# Patient Record
Sex: MALE | Race: WHITE | NOT HISPANIC OR LATINO | Employment: FULL TIME | URBAN - METROPOLITAN AREA
[De-identification: names, ages, dates, MRNs, and addresses within clinical notes are randomized per-mention and may not be internally consistent; named-entity substitution may affect disease eponyms.]

---

## 2019-08-21 ENCOUNTER — HOSPITAL ENCOUNTER (OUTPATIENT)
Dept: GENERAL RADIOLOGY | Facility: HOSPITAL | Age: 39
Discharge: HOME | End: 2019-08-21
Attending: INTERNAL MEDICINE

## 2019-10-07 ENCOUNTER — HOSPITAL ENCOUNTER (OUTPATIENT)
Dept: OTHER | Facility: HOSPITAL | Age: 39
Discharge: HOME | End: 2019-10-07
Attending: EMERGENCY MEDICINE

## 2020-01-22 ENCOUNTER — HOSPITAL ENCOUNTER (OUTPATIENT)
Dept: SLEEP MEDICINE | Facility: HOSPITAL | Age: 40
Discharge: HOME | End: 2020-01-22
Attending: PSYCHIATRY & NEUROLOGY

## 2020-06-08 ENCOUNTER — HOSPITAL ENCOUNTER (OUTPATIENT)
Dept: OTHER | Facility: HOSPITAL | Age: 40
Discharge: HOME | End: 2020-06-08
Attending: EMERGENCY MEDICINE

## 2021-05-14 ENCOUNTER — HOSPITAL ENCOUNTER (OUTPATIENT)
Dept: OTHER | Facility: HOSPITAL | Age: 41
Discharge: HOME | End: 2021-05-14
Attending: EMERGENCY MEDICINE

## 2022-02-22 ENCOUNTER — TRANSCRIBE ORDERS (OUTPATIENT)
Dept: ADMINISTRATIVE | Facility: HOSPITAL | Age: 42
End: 2022-02-22

## 2022-02-22 ENCOUNTER — OFFICE VISIT (OUTPATIENT)
Dept: SLEEP MEDICINE | Facility: HOSPITAL | Age: 42
End: 2022-02-22

## 2022-02-22 VITALS
TEMPERATURE: 97.6 F | OXYGEN SATURATION: 96 % | SYSTOLIC BLOOD PRESSURE: 143 MMHG | HEART RATE: 68 BPM | WEIGHT: 270 LBS | DIASTOLIC BLOOD PRESSURE: 88 MMHG

## 2022-02-22 DIAGNOSIS — I49.9 CARDIAC ARRHYTHMIA, UNSPECIFIED CARDIAC ARRHYTHMIA TYPE: Primary | ICD-10-CM

## 2022-02-22 DIAGNOSIS — G47.33 OSA (OBSTRUCTIVE SLEEP APNEA): Primary | ICD-10-CM

## 2022-02-22 PROCEDURE — G0463 HOSPITAL OUTPT CLINIC VISIT: HCPCS | Performed by: PSYCHIATRY & NEUROLOGY

## 2022-03-02 ENCOUNTER — HOSPITAL ENCOUNTER (OUTPATIENT)
Dept: CARDIOLOGY | Facility: HOSPITAL | Age: 42
Discharge: HOME | End: 2022-03-02
Admitting: NURSE PRACTITIONER

## 2022-03-02 DIAGNOSIS — I49.9 CARDIAC ARRHYTHMIA, UNSPECIFIED CARDIAC ARRHYTHMIA TYPE: ICD-10-CM

## 2022-03-02 PROCEDURE — 93246 EXT ECG>7D<15D RECORDING: CPT

## 2022-03-08 NOTE — PROGRESS NOTES
[facility]    SLEEP CLINIC FOLLOW UP PROGRESS NOTE.    Nirmal Mendoza  1980  41 y.o.  male      PCP: Bharath Parkinson MD      Date of visit: 2/22/2022  The patient is returning for follow-up visit  Reason for follow-up visit: Obstructive sleep apnea     HPI:  This is a 41 y.o. years old patient who has a history of obstructive sleep apnea.  He is here for  compliance follow-up.  Sleep apnea was initially severe, showing an AHI of 70.3 events per hour total sleep time when he had his a sleep study done in 2011.  Following a significant amount of weight loss, his sleep apnea improved to an AHI of 5.0 events per hour total sleep time.  However, unfortunately, he gained his weight back.  He has continued to use his CPAP machine.  He was last seen for a follow-up visit on 1/22/2020 showing 93 9.4% compliance. Patient is using positive airway pressure therapy with CPAP and the symptoms of snoring, non-restorative sleep and daytime excessive sleepiness have improved significantly.  Normally goes to bed at 10 PM and wakes up at 5:30 AM.  The patient wakes up only 1 time to use the restroom during the night and has no problem going back to sleep.  Feels refreshed after waking up.  Patient also denies headaches and nasal congestion.  Since his last follow-up visit, he reports that his weight has been stable.  He denies any problems with current device settings.  He has been using his CPAP machine regularly.  He denies any recent injuries to the nose or surgeries to the nose and throat area.    Interval past medical/surgical history: Noncontributory    Mediactions and allergies are reviewed by me and documented in the encounter.     SOCIAL ( habits pertaining to sleep medicine)  History of smoking:Yes.  He chews tobacco  History of alcohol use: 6 per week  Caffeine use: 0    REVIEW OF SYSTEMS:   Dewey Sleepiness Scale :Total score: 2   Nsaal congestion: No  Dry mouth/nose: No  Post nasal drip; no  Acid  reflux/Heartburn: No  Abd bloating: No  Morining headache: No  Anxiety: No  Depression: No    Physical Exam :  CONSTITUTIONAL:  Vitals:    02/22/22 1100   BP: 143/88   Pulse: 68   Temp: 97.6 °F (36.4 °C)   SpO2: 96%   Weight: 122 kg (270 lb)    There is no height or weight on file to calculate BMI.   Neck: No carotid bruits  RESP SYSTEM: Breath sounds are normal, no wheezes or crackles  CARDIOVASULAR: Heart rate is regular without murmur.  Possible PVCs noted.  Neuropsych: Is awake alert and oriented.  Responses are coherent and relevant.  Mood and affect appeared appropriate.      Data reviewed:  The Smart card downloaded on 2/22/2022 has been reviewed independently by me for compliance and discussed the data with the patient.   Compliance; 100%  More than 4 hr use, 100%  Average use of the device 8 hours 16 minutes per night  Residual AHI: 2.6 /hr (goal < 5.0 /hr).  He is currently on auto CPAP at 6 to 13 cm water pressure.  His current therapy pressures range from 10.5-13 cmH2O.  Mask type: Nasal  DME: [facility] [facility]     ASSESSMENT AND PLAN:  · Obstructive sleep apnea ( G 47.33).  The symptoms of sleep apnea have improved with the device and the treatment.  Patient's compliance with the device is excellent for treatment of sleep apnea.  I have independently reviewed the smart card down load and discussed with the patient the download data and encouraged  the patient to continue to use the device.The residual AHI is acceptable. The patient is also instructed to get the supplies from the [employer] and and change them on a regular basis.  A prescription for supplies has been sent to the [employer].  I have also discussed the good sleep hygiene habits and adequate amount of sleep needed for good health.  · Cardiac arrhythmia-probable PVCs.  He was asked to check with his PCP regarding this.  · Equipment recall discussed.  He was given the information to check on this.  · Return in about 1 year (around 2/22/2023). .  Patient's questions were answered.      Zo Berry MD  3/8/2022

## 2022-03-27 LAB
MAXIMAL PREDICTED HEART RATE: 179 BPM
STRESS TARGET HR: 152 BPM

## 2022-03-27 PROCEDURE — 93248 EXT ECG>7D<15D REV&INTERPJ: CPT | Performed by: INTERNAL MEDICINE

## 2022-03-28 ENCOUNTER — TELEPHONE (OUTPATIENT)
Dept: CARDIOLOGY | Facility: HOSPITAL | Age: 42
End: 2022-03-28

## 2022-03-28 NOTE — TELEPHONE ENCOUNTER
Spoke with Olamide at [facility]'s office and told her that Dr. Calderon asked me to call and give the results of ab normal Holter report so that the provider could go over with the patient.  8:22am

## 2022-07-03 ENCOUNTER — APPOINTMENT (OUTPATIENT)
Dept: GENERAL RADIOLOGY | Facility: HOSPITAL | Age: 42
End: 2022-07-03

## 2022-07-03 VITALS
OXYGEN SATURATION: 97 % | DIASTOLIC BLOOD PRESSURE: 60 MMHG | HEIGHT: 70 IN | SYSTOLIC BLOOD PRESSURE: 131 MMHG | HEART RATE: 78 BPM | WEIGHT: 279.1 LBS | BODY MASS INDEX: 39.96 KG/M2 | RESPIRATION RATE: 20 BRPM | TEMPERATURE: 98.3 F

## 2022-07-03 PROCEDURE — 99282 EMERGENCY DEPT VISIT SF MDM: CPT

## 2022-07-03 PROCEDURE — 73130 X-RAY EXAM OF HAND: CPT

## 2022-07-03 RX ORDER — LIDOCAINE HYDROCHLORIDE AND EPINEPHRINE 10; 10 MG/ML; UG/ML
10 INJECTION, SOLUTION INFILTRATION; PERINEURAL ONCE
Status: COMPLETED | OUTPATIENT
Start: 2022-07-04 | End: 2022-07-04

## 2022-07-04 ENCOUNTER — HOSPITAL ENCOUNTER (EMERGENCY)
Facility: HOSPITAL | Age: 42
Discharge: HOME | End: 2022-07-04
Attending: EMERGENCY MEDICINE | Admitting: EMERGENCY MEDICINE

## 2022-07-04 DIAGNOSIS — S61.421A LACERATION OF RIGHT HAND WITH FOREIGN BODY, INITIAL ENCOUNTER: Primary | ICD-10-CM

## 2022-07-04 RX ORDER — OLMESARTAN MEDOXOMIL AND HYDROCHLOROTHIAZIDE 40/12.5 40; 12.5 MG/1; MG/1
TABLET ORAL
COMMUNITY

## 2022-07-04 RX ORDER — VILAZODONE HYDROCHLORIDE 20 MG/1
TABLET ORAL
COMMUNITY

## 2022-07-04 RX ADMIN — LIDOCAINE HYDROCHLORIDE AND EPINEPHRINE 10 ML: 10; 10 INJECTION, SOLUTION INFILTRATION; PERINEURAL at 03:25

## 2022-07-04 NOTE — DISCHARGE INSTRUCTIONS
Rest.  Ice.  Elevate.    Keep wound clean and dry.  May wash daily with soap and water.    Sutures out in 7 to 10 days.  Follow-up with your PCP.    Alternate Tylenol and ibuprofen for any fever or pain    Return for new or worsening symptoms
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
## 2023-12-29 ENCOUNTER — HOSPITAL ENCOUNTER (OUTPATIENT)
Dept: GENERAL RADIOLOGY | Facility: HOSPITAL | Age: 43
Discharge: HOME | End: 2023-12-29
Admitting: NURSE PRACTITIONER
Payer: COMMERCIAL

## 2023-12-29 ENCOUNTER — TRANSCRIBE ORDERS (OUTPATIENT)
Dept: ADMINISTRATIVE | Facility: HOSPITAL | Age: 43
End: 2023-12-29
Payer: COMMERCIAL

## 2023-12-29 DIAGNOSIS — M79.672 LEFT FOOT PAIN: ICD-10-CM

## 2023-12-29 DIAGNOSIS — M10.9 GOUT OF LEFT FOOT, UNSPECIFIED CAUSE, UNSPECIFIED CHRONICITY: ICD-10-CM

## 2023-12-29 DIAGNOSIS — M10.9 GOUT OF LEFT FOOT, UNSPECIFIED CAUSE, UNSPECIFIED CHRONICITY: Primary | ICD-10-CM

## 2023-12-29 PROCEDURE — 73630 X-RAY EXAM OF FOOT: CPT

## 2024-01-15 ENCOUNTER — OFFICE VISIT (OUTPATIENT)
Dept: SLEEP MEDICINE | Facility: HOSPITAL | Age: 44
End: 2024-01-15
Payer: COMMERCIAL

## 2024-01-15 VITALS
WEIGHT: 233.6 LBS | HEIGHT: 70 IN | HEART RATE: 33 BPM | SYSTOLIC BLOOD PRESSURE: 152 MMHG | OXYGEN SATURATION: 96 % | DIASTOLIC BLOOD PRESSURE: 70 MMHG | BODY MASS INDEX: 33.44 KG/M2

## 2024-01-15 DIAGNOSIS — E66.9 CLASS 1 OBESITY: Primary | ICD-10-CM

## 2024-01-15 DIAGNOSIS — I10 ESSENTIAL HYPERTENSION, BENIGN: ICD-10-CM

## 2024-01-15 PROBLEM — E66.811 CLASS 1 OBESITY: Status: ACTIVE | Noted: 2024-01-15

## 2024-01-15 PROBLEM — G47.33 OSA ON CPAP: Status: ACTIVE | Noted: 2024-01-15

## 2024-01-15 PROCEDURE — G0463 HOSPITAL OUTPT CLINIC VISIT: HCPCS

## 2024-01-15 PROCEDURE — 99203 OFFICE O/P NEW LOW 30 MIN: CPT | Performed by: INTERNAL MEDICINE

## 2024-01-15 RX ORDER — ATENOLOL 25 MG/1
25 TABLET ORAL DAILY
COMMUNITY

## 2024-01-15 RX ORDER — VALSARTAN AND HYDROCHLOROTHIAZIDE 80; 12.5 MG/1; MG/1
1 TABLET, FILM COATED ORAL DAILY
COMMUNITY
Start: 2023-12-11

## 2024-01-15 RX ORDER — VILAZODONE HYDROCHLORIDE 40 MG/1
TABLET ORAL
COMMUNITY
Start: 2023-10-27

## 2024-01-15 NOTE — PROGRESS NOTES
"  [address] [address]  Phone: [phone]  Fax: [phone]      Nirmal Mendoza  [id number]   1980  43 y.o.  male      PCP:Bharath Parkinson MD    Type of service: Initial New Patient Office Visit  Date of service: 1/15/2024    Chief Complaint   Patient presents with    Sleep Apnea    Obesity       History of present illness;  Nirmal Mendoza 43 y.o.  is a new patient for me and was seen today for management of obstructive sleep apnea.  Used to see Dr. Berry in the past.  He had a split-night study in September 2011 found to have severe sleep apnea with AHI of 70/h and has been given a auto CPAP.  He uses the CPAP on a regular basis but unfortunately he had not seen physician for the past 2 years.  He needs supplies.  He is a  and also drives trucks.    Patient gives the following sleep history.  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  Full review of systems available on the intake form which is scanned in the media tab.  The relevant positive are noted below  Daytime excessive sleepiness with Grinnell Sleepiness Scale :Total score: 1   Snoring resolved        Physical exam:  Vitals:    01/15/24 1100   BP: 152/70   Pulse: (!) 33   SpO2: 96%   Weight: 106 kg (233 lb 9.6 oz)   Height: 177.8 cm (70\")    Body mass index is 33.52 kg/m². Neck Circumference: 17 inches  Nose: no nasal " septal defects or deviation and the nasal passages are clear, no nasal polyps,  Throat: tonsils are nonenlarged, tongue normal, oral airway Mallampati class 3  NECK:Neck Circumference: 17 inches, trachea is in the midline, thyroid not enlarged  RESPIRATORY SYSTEM: Breath sounds are equal on both sides, there are no wheezes   CARDIOVASULAR SYSTEM: Heart sounds are regular rhythm and sundeep rate, no edema  EXTREMITES: No cyanosis, clubbing  NEUROLOGICAL SYSTEM: Oriented x 3, no gross motor defects, gait normal        The Smart card downloaded on 1/15/2024 has been independently reviewed by me and discussed the data with the patient. It shows the following..  Compliance; 100%  > 4 hr use, 98%  Average use of the device 7 hours and 43 minutes per night  Residual AHI: 3 /hr (normal less than 5)  Mask type: Nasal mask  Device: [employer]ics DreamStation  DME: [facility] [facility]    Assessment and plan:  Obstructive sleep apnea ( G 47.33).  The symptoms of sleep apnea have improved with the device and the treatment.  Patient's compliance with the device is excellent for treatment of sleep apnea.  I have independently reviewed the smart card down load and discussed with the patient the download data and encouarged the patient to continue to use the device.The residual AHI is acceptable. The device is benefiting the patient and the device is medically necessary. Without proper control of sleep apnea and good compliance there is a increased risk for hypertension, diabetes mellitus and nonrestorative sleep with hypersomnia which can increase risk for motor vehicle accidents.  Untreated sleep apnea is also a risk factor for development of atrial fibrillation, pulmonary hypertension and stroke.The patient is also instructed to get the supplies from the [employer] and and change them on a regular basis.  A prescription for supplies has been sent to the [employer].  I have also discussed the good sleep hygiene habits and adequate amount of  sleep needed for good health.   Obesity 1, with BMI Body mass index is 33.52 kg/m².. I have discussed the relationship between weight and sleep apnea.There is direct correlation between weight and severity of sleep apnea.  Weight reduction is encouraged, as it is going to reduce the severity of sleep apnea. I have also discussed with the patient diet and exercise to achieve ideal body weight  Hypertension,  Essential hypertension is highly correlated with sleep apnea. Treating sleep apnea will assist in good blood pressure control.  Return in about 1 year (around 1/15/2025) for with smart card down load..  Patient's questions were answered.      1/15/2024  Chele Lockhart MD  Sleep Medicine  Medical Director  [facility]: [facility] sleep [facility]

## 2025-01-22 ENCOUNTER — OFFICE VISIT (OUTPATIENT)
Dept: SLEEP MEDICINE | Facility: HOSPITAL | Age: 45
End: 2025-01-22
Payer: COMMERCIAL

## 2025-01-22 VITALS
HEART RATE: 36 BPM | BODY MASS INDEX: 28.39 KG/M2 | OXYGEN SATURATION: 99 % | DIASTOLIC BLOOD PRESSURE: 62 MMHG | HEIGHT: 70 IN | WEIGHT: 198.3 LBS | SYSTOLIC BLOOD PRESSURE: 128 MMHG

## 2025-01-22 DIAGNOSIS — G47.33 OSA ON CPAP: Primary | ICD-10-CM

## 2025-01-22 DIAGNOSIS — I10 ESSENTIAL HYPERTENSION, BENIGN: ICD-10-CM

## 2025-01-22 PROBLEM — E66.811 CLASS 1 OBESITY: Status: RESOLVED | Noted: 2024-01-15 | Resolved: 2025-01-22

## 2025-01-22 PROCEDURE — 99214 OFFICE O/P EST MOD 30 MIN: CPT | Performed by: INTERNAL MEDICINE

## 2025-01-22 PROCEDURE — G0463 HOSPITAL OUTPT CLINIC VISIT: HCPCS

## 2025-01-22 NOTE — PROGRESS NOTES
"  [facility]  Sleep Medicine   [address] [place] [address]  [place]   [place] [address]  Phone: [phone]  Fax: [phone]      SLEEP CLINIC FOLLOW UP PROGRESS NOTE.    Nirmal Mendoza  [id number]   1980  44 y.o.  male      PCP: Bharath Parkinson MD      Date of visit: 1/22/2025    Chief Complaint   Patient presents with    Sleep Apnea       HPI:  This is a 44 y.o. years old patient is here for the management of obstructive sleep apnea.  Sleep apnea is severe in severity with a AHI of 70/hr. Patient is using positive airway pressure therapy with auto CPAP and the symptoms of sleep apnea have improved significantly on the therapy. Normally patient goes to bed at 1030 PM and wakes up at 5 AM .  The patient wakes up none time(s) during the night and has no problem going back to sleep.  Feels refreshed after waking up.     Medications and allergies are reviewed by me and documented in the encounter.     SOCIAL (habits pertaining to sleep medicine)  History tobacco use:Yes chew tobacco  History of alcohol use: 0 per week  Caffeine use: 2     REVIEW OF SYSTEMS:   Pertaining positive symptoms are:  Dameron Sleepiness Scale :Total score: 1       PHYSICAL EXAMINATION:  CONSTITUTIONAL:  Vitals:    01/22/25 0700   BP: 128/62   Pulse: (!) 36   SpO2: 99%   Weight: 89.9 kg (198 lb 4.8 oz)   Height: 177.8 cm (70\")    Body mass index is 28.45 kg/m².   NOSE: nasal passages are clear, No deformities noted   RESP SYSTEM: Not in any respiratory distress, no chest deformities noted,   CARDIOVASULAR: No edema noted  NEURO: Oriented x 3, gait normal,  Mood and affect appeared appropriate      Data reviewed:  The Smart card downloaded on 1/22/2025 has been reviewed independently by me for compliance and discussed the data with the patient.   Compliance; 100%  More than 4 hr use, 100%  Average use of the device 7 hours and 36 minutes per night  Residual AHI: 3.8 /hr (Optimal < 5/hr, Good <10/hr, Adequate reduce by " 75% from baseline)  Mask type: Nasal mask  Device: DreamStation  DME: [facility] [facility]    BMI is >= 25 and <30. (Overweight) The following options were offered after discussion;: Not indicated      ASSESSMENT AND PLAN:  Obstructive sleep apnea ( G 47.33).  The symptoms of sleep apnea have improved with the device and the treatment.  Patient's compliance with the device is excellent for treatment of sleep apnea.  I have independently reviewed the smart card down load and discussed with the patient the download data and encouarged the patient to continue to use the device.The residual AHI is acceptable. The device is benefiting the patient and the device is medically necessary.  Without proper control of sleep apnea and good compliance there is a increased risk for hypertension, diabetes mellitus and nonrestorative sleep with hypersomnia which can increase risk for motor vehicle accidents.  Untreated sleep apnea is also a risk factor for development of atrial fibrillation, pulmonary hypertension, insulin resistance and stroke. The patient is also instructed to get the supplies from the DME company and and change them on a regular basis.  A prescription for supplies has been sent to the DME company.  I have also discussed the good sleep hygiene habits and adequate amount of sleep needed for good health.  Hypertension.  On medication  Bradycardia, due to beta-blocker which has been stopped last week  Return in about 1 year (around 1/22/2026) for with smart card down load. . Patient's questions were answered.    1/22/2025  Chele Lockhart MD  Sleep Medicine.  Medical Director,   [facility] sleep centers.